# Patient Record
Sex: FEMALE | Race: WHITE | URBAN - METROPOLITAN AREA
[De-identification: names, ages, dates, MRNs, and addresses within clinical notes are randomized per-mention and may not be internally consistent; named-entity substitution may affect disease eponyms.]

---

## 2021-03-01 ENCOUNTER — TELEPHONE ENCOUNTER (OUTPATIENT)
Dept: URBAN - METROPOLITAN AREA CLINIC 35 | Facility: CLINIC | Age: 71
End: 2021-03-01

## 2021-03-01 ENCOUNTER — TELEPHONE ENCOUNTER (OUTPATIENT)
Dept: URBAN - METROPOLITAN AREA CLINIC 23 | Facility: CLINIC | Age: 71
End: 2021-03-01

## 2021-03-02 ENCOUNTER — TELEPHONE ENCOUNTER (OUTPATIENT)
Dept: URBAN - METROPOLITAN AREA CLINIC 35 | Facility: CLINIC | Age: 71
End: 2021-03-02

## 2021-03-24 ENCOUNTER — OFFICE VISIT (OUTPATIENT)
Dept: URBAN - METROPOLITAN AREA CLINIC 35 | Facility: CLINIC | Age: 71
End: 2021-03-24

## 2021-03-24 VITALS
DIASTOLIC BLOOD PRESSURE: 64 MMHG | TEMPERATURE: 98 F | BODY MASS INDEX: 26.2 KG/M2 | WEIGHT: 163 LBS | OXYGEN SATURATION: 98 % | SYSTOLIC BLOOD PRESSURE: 120 MMHG | HEART RATE: 77 BPM | HEIGHT: 66 IN

## 2021-03-24 PROBLEM — 62315008: Status: ACTIVE | Noted: 2021-03-24

## 2021-03-24 PROBLEM — 16331000 HEARTBURN: Status: ACTIVE | Noted: 2021-03-24

## 2021-03-24 RX ORDER — AMLODIPINE BESYLATE 10 MG/1
1 TABLET TABLET ORAL ONCE A DAY
Qty: 30 | Status: ACTIVE | COMMUNITY

## 2021-03-24 RX ORDER — MELATONIN 3 MG
1 TABLET AT BEDTIME AS NEEDED TABLET ORAL ONCE A DAY
Qty: 30 | Status: ACTIVE | COMMUNITY

## 2021-03-24 RX ORDER — CHOLESTYRAMINE 4 G/9G
1 SCOOP POWDER, FOR SUSPENSION ORAL ONCE A DAY
Qty: 1 CONTAINER | Refills: 2 | OUTPATIENT

## 2021-03-24 RX ORDER — BUSPIRONE HYDROCHLORIDE 5 MG/1
1 TABLET TABLET ORAL TWICE A DAY
Status: ACTIVE | COMMUNITY

## 2021-03-24 RX ORDER — FAMOTIDINE 10 MG/1
1 TABLET AS NEEDED TABLET, FILM COATED ORAL TWICE A DAY
Status: ACTIVE | COMMUNITY

## 2021-03-24 RX ORDER — CHOLESTYRAMINE 4 G/9G
1 SCOOP POWDER, FOR SUSPENSION ORAL ONCE A DAY
Qty: 30 | Status: ACTIVE | COMMUNITY

## 2021-03-24 NOTE — HPI-MIGRATED HPI
;     Gastroesophageal Reflux : 70 year old female patient presents for heartburn consult. Patient states he has been experiencing a longstanding hx of acid reflux. Patient admits currently taking any Pepcid 10 mg and states that she has made some dietary modifications and it helps control her symptoms. She reports that it she would be able to take Nexium twice then she feels that symptoms would be completed controlled.   She has tried Nexium 20mg, Pantoprazole 40mg  Famotidine 40 mg, Raberprazole 20 mg - She denies any relief from these medications.    Patient describes symptoms as a burning in her esophagus, she admits that if she eats a large meal and a small glass of wine she will be awaken from her sleep.   Patient admits occasional episodes of nausea and vomiting.   Outside Records :  EGD(12/02/2014)  3 cm Hiatal Hernia , Duodenal erosions : Gastric Heterotopia , Reflux Esophagitis;

## 2021-04-21 ENCOUNTER — OFFICE VISIT (OUTPATIENT)
Dept: URBAN - METROPOLITAN AREA CLINIC 35 | Facility: CLINIC | Age: 71
End: 2021-04-21

## 2021-05-19 ENCOUNTER — OFFICE VISIT (OUTPATIENT)
Dept: URBAN - METROPOLITAN AREA CLINIC 35 | Facility: CLINIC | Age: 71
End: 2021-05-19

## 2021-06-22 ENCOUNTER — OFFICE VISIT (OUTPATIENT)
Dept: URBAN - METROPOLITAN AREA CLINIC 35 | Facility: CLINIC | Age: 71
End: 2021-06-22

## 2021-06-22 VITALS
DIASTOLIC BLOOD PRESSURE: 70 MMHG | HEART RATE: 64 BPM | SYSTOLIC BLOOD PRESSURE: 128 MMHG | BODY MASS INDEX: 26.03 KG/M2 | WEIGHT: 162 LBS | HEIGHT: 66 IN | OXYGEN SATURATION: 97 %

## 2021-06-22 RX ORDER — FAMOTIDINE, CALCIUM CARBONATE, AND MAGNESIUM HYDROXIDE 10; 800; 165 MG/1; MG/1; MG/1
1 TABLET AS NEEDED TABLET, CHEWABLE ORAL TWICE A DAY
Status: ACTIVE | COMMUNITY

## 2021-06-22 RX ORDER — FAMOTIDINE 10 MG/1
1 TABLET AS NEEDED TABLET, FILM COATED ORAL TWICE A DAY
Status: DISCONTINUED | COMMUNITY

## 2021-06-22 RX ORDER — CHOLESTYRAMINE 4 G/9G
1 SCOOP POWDER, FOR SUSPENSION ORAL ONCE A DAY
Qty: 1 CONTAINER | Refills: 2 | Status: ACTIVE | COMMUNITY

## 2021-06-22 RX ORDER — CHOLESTYRAMINE 4 G/9G
1 SCOOP POWDER, FOR SUSPENSION ORAL ONCE A DAY
Qty: 1 CANISTER | Refills: 5 | OUTPATIENT

## 2021-06-22 RX ORDER — BUSPIRONE HYDROCHLORIDE 5 MG/1
1 TABLET TABLET ORAL TWICE A DAY
Status: ACTIVE | COMMUNITY

## 2021-06-22 RX ORDER — MELATONIN 3 MG
1 TABLET AT BEDTIME AS NEEDED TABLET ORAL ONCE A DAY
Qty: 30 | Status: ACTIVE | COMMUNITY

## 2021-06-22 RX ORDER — AMLODIPINE BESYLATE 10 MG/1
1 TABLET TABLET ORAL ONCE A DAY
Qty: 30 | Status: ACTIVE | COMMUNITY

## 2021-06-22 NOTE — HPI-MIGRATED HPI
;   ;     Diarrhea : Patient presents for follow up of diarrhea. Rick admits continuing cholestryramine powder daily . She does mention she rathers taking medication manufactured by [facility] with better results . She admits 1 bowel movement a day . She admits coffee aggravates her symptoms where she will have continuous bowel movements . She admits using immodium as needed . She denies blood in stool . She admits a little discomfort until after she completes bowel movements . ;   Gastroesophageal Reflux : Patient presents for follow up of GERD . . Patient admits improvement since last visit. Patient admits continuing Pepcid complete daily and 1 additional tablet as needed . She denies nausea or vomiting .    Of last visit (03/24/2021) 70 year old female patient presents for heartburn consult. Patient states he has been experiencing a longstanding hx of acid reflux. Patient admits currently taking any Pepcid 10 mg and states that she has made some dietary modifications and it helps control her symptoms. She reports that it she would be able to take Nexium twice then she feels that symptoms would be completed controlled.   She has tried Nexium 20mg, Pantoprazole 40mg  Famotidine 40 mg, Raberprazole 20 mg - She denies any relief from these medications.    Patient describes symptoms as a burning in her esophagus, she admits that if she eats a large meal and a small glass of wine she will be awaken from her sleep.   Patient admits occasional episodes of nausea and vomiting.   Outside Records :  EGD(12/02/2014)  3 cm Hiatal Hernia , Duodenal erosions : Gastric Heterotopia , Reflux Esophagitis;

## 2021-07-13 ENCOUNTER — OFFICE VISIT (OUTPATIENT)
Dept: URBAN - METROPOLITAN AREA CLINIC 35 | Facility: CLINIC | Age: 71
End: 2021-07-13

## 2022-12-28 ENCOUNTER — TELEPHONE ENCOUNTER (OUTPATIENT)
Dept: URBAN - METROPOLITAN AREA CLINIC 36 | Facility: CLINIC | Age: 72
End: 2022-12-28

## 2023-01-23 ENCOUNTER — OFFICE VISIT (OUTPATIENT)
Dept: URBAN - METROPOLITAN AREA CLINIC 35 | Facility: CLINIC | Age: 73
End: 2023-01-23
Payer: COMMERCIAL

## 2023-01-23 VITALS
OXYGEN SATURATION: 98 % | WEIGHT: 159 LBS | SYSTOLIC BLOOD PRESSURE: 116 MMHG | BODY MASS INDEX: 25.55 KG/M2 | DIASTOLIC BLOOD PRESSURE: 78 MMHG | HEART RATE: 57 BPM | HEIGHT: 66 IN

## 2023-01-23 DIAGNOSIS — K90.9 INTESTINAL MALABSORPTION, UNSPECIFIED: ICD-10-CM

## 2023-01-23 DIAGNOSIS — R12 HEARTBURN: ICD-10-CM

## 2023-01-23 PROBLEM — 62315008: Status: ACTIVE | Noted: 2023-01-23

## 2023-01-23 PROCEDURE — 99214 OFFICE O/P EST MOD 30 MIN: CPT | Performed by: PHYSICIAN ASSISTANT

## 2023-01-23 RX ORDER — CHOLESTYRAMINE 4 G/9G
1 SCOOP POWDER, FOR SUSPENSION ORAL ONCE A DAY
Qty: 3 | Refills: 3

## 2023-01-23 RX ORDER — BUSPIRONE HYDROCHLORIDE 5 MG/1
1 TABLET TABLET ORAL TWICE A DAY
Status: DISCONTINUED | COMMUNITY

## 2023-01-23 RX ORDER — MELATONIN 3 MG
1 TABLET AT BEDTIME AS NEEDED TABLET ORAL ONCE A DAY
Qty: 30 | Status: DISCONTINUED | COMMUNITY

## 2023-01-23 RX ORDER — CHOLESTYRAMINE 4 G/9G
1 SCOOP POWDER, FOR SUSPENSION ORAL ONCE A DAY
Qty: 1 CANISTER | Refills: 5 | Status: ACTIVE | COMMUNITY

## 2023-01-23 RX ORDER — FAMOTIDINE, CALCIUM CARBONATE, AND MAGNESIUM HYDROXIDE 10; 800; 165 MG/1; MG/1; MG/1
1 TABLET AS NEEDED TABLET, CHEWABLE ORAL TWICE A DAY
Status: ACTIVE | COMMUNITY

## 2023-01-23 RX ORDER — AMLODIPINE BESYLATE 2.5 MG/1
1 TABLET TABLET ORAL ONCE A DAY
Qty: 30 | Status: ACTIVE | COMMUNITY

## 2023-01-23 NOTE — HPI-DIARRHEA
72 year old female patient presents today for a follow up of diarrhea. Patient currently admits 1 bowel movement per formed, with/without strain. Stools are occasionally loose and otherwise formed. Denies any blood, mucus, or melena in stools. She denies any pruritus ani or rectal pain. Denies fecal incontinence. Admits continued use of Cholestyramine Powder, 4 GM/DOSE and Imodium prn.  She states this regimen has been working well for her.

## 2023-01-23 NOTE — HPI-HEARTBURN
Admits occasional continued episodes of heartburn since her last visit. She states symptoms are controlled with Pepcid Complete as well as having small meals. She states symptoms are improved since her last visit 2 years ago.  She last had an EGD 2014 with findings of reflux esophagitis.